# Patient Record
Sex: MALE | Race: WHITE
[De-identification: names, ages, dates, MRNs, and addresses within clinical notes are randomized per-mention and may not be internally consistent; named-entity substitution may affect disease eponyms.]

---

## 2021-04-19 ENCOUNTER — HOSPITAL ENCOUNTER (EMERGENCY)
Dept: HOSPITAL 75 - ER | Age: 41
Discharge: HOME | End: 2021-04-19
Payer: SELF-PAY

## 2021-04-19 VITALS — DIASTOLIC BLOOD PRESSURE: 78 MMHG | SYSTOLIC BLOOD PRESSURE: 149 MMHG

## 2021-04-19 VITALS — BODY MASS INDEX: 25.14 KG/M2 | HEIGHT: 68.9 IN | WEIGHT: 169.76 LBS

## 2021-04-19 DIAGNOSIS — K62.5: Primary | ICD-10-CM

## 2021-04-19 DIAGNOSIS — Z23: ICD-10-CM

## 2021-04-19 DIAGNOSIS — Z88.0: ICD-10-CM

## 2021-04-19 DIAGNOSIS — F15.90: ICD-10-CM

## 2021-04-19 DIAGNOSIS — F19.90: ICD-10-CM

## 2021-04-19 DIAGNOSIS — K62.89: ICD-10-CM

## 2021-04-19 DIAGNOSIS — F17.210: ICD-10-CM

## 2021-04-19 LAB
ALBUMIN SERPL-MCNC: 4.1 GM/DL (ref 3.2–4.5)
ALP SERPL-CCNC: 53 U/L (ref 40–136)
ALT SERPL-CCNC: 30 U/L (ref 0–55)
APTT BLD: 28 SEC (ref 24–35)
APTT PPP: YELLOW S
BACTERIA #/AREA URNS HPF: (no result) /HPF
BARBITURATES UR QL: NEGATIVE
BASOPHILS # BLD AUTO: 0.1 10^3/UL (ref 0–0.1)
BASOPHILS NFR BLD AUTO: 1 % (ref 0–10)
BENZODIAZ UR QL SCN: NEGATIVE
BILIRUB SERPL-MCNC: 0.5 MG/DL (ref 0.1–1)
BILIRUB UR QL STRIP: NEGATIVE
BUN/CREAT SERPL: 17
CALCIUM SERPL-MCNC: 8.5 MG/DL (ref 8.5–10.1)
CHLORIDE SERPL-SCNC: 107 MMOL/L (ref 98–107)
CO2 SERPL-SCNC: 23 MMOL/L (ref 21–32)
COCAINE UR QL: NEGATIVE
CREAT SERPL-MCNC: 1.06 MG/DL (ref 0.6–1.3)
EOSINOPHIL # BLD AUTO: 0.3 10^3/UL (ref 0–0.3)
EOSINOPHIL NFR BLD AUTO: 3 % (ref 0–10)
FIBRINOGEN PPP-MCNC: CLEAR MG/DL
GFR SERPLBLD BASED ON 1.73 SQ M-ARVRAT: > 60 ML/MIN
GLUCOSE SERPL-MCNC: 90 MG/DL (ref 70–105)
GLUCOSE UR STRIP-MCNC: NEGATIVE MG/DL
HCT VFR BLD CALC: 40 % (ref 40–54)
HGB BLD-MCNC: 13.7 G/DL (ref 13.3–17.7)
INR PPP: 1 (ref 0.8–1.4)
KETONES UR QL STRIP: NEGATIVE
LEUKOCYTE ESTERASE UR QL STRIP: NEGATIVE
LYMPHOCYTES # BLD AUTO: 1.9 10^3/UL (ref 1–4)
LYMPHOCYTES NFR BLD AUTO: 20 % (ref 12–44)
MANUAL DIFFERENTIAL PERFORMED BLD QL: NO
MCH RBC QN AUTO: 31 PG (ref 25–34)
MCHC RBC AUTO-ENTMCNC: 35 G/DL (ref 32–36)
MCV RBC AUTO: 89 FL (ref 80–99)
METHADONE UR QL SCN: NEGATIVE
METHAMPHETAMINE SCREEN URINE S: POSITIVE
MONOCYTES # BLD AUTO: 0.8 10^3/UL (ref 0–1)
MONOCYTES NFR BLD AUTO: 9 % (ref 0–12)
NEUTROPHILS # BLD AUTO: 6.7 10^3/UL (ref 1.8–7.8)
NEUTROPHILS NFR BLD AUTO: 68 % (ref 42–75)
NITRITE UR QL STRIP: NEGATIVE
OPIATES UR QL SCN: NEGATIVE
OXYCODONE UR QL: NEGATIVE
PH UR STRIP: 6 [PH] (ref 5–9)
PLATELET # BLD: 318 10^3/UL (ref 130–400)
PMV BLD AUTO: 9.4 FL (ref 9–12.2)
POTASSIUM SERPL-SCNC: 3.8 MMOL/L (ref 3.6–5)
PROPOXYPH UR QL: NEGATIVE
PROT SERPL-MCNC: 6.7 GM/DL (ref 6.4–8.2)
PROT UR QL STRIP: NEGATIVE
PROTHROMBIN TIME: 13.3 SEC (ref 12.2–14.7)
RBC #/AREA URNS HPF: (no result) /HPF
SODIUM SERPL-SCNC: 141 MMOL/L (ref 135–145)
SP GR UR STRIP: 1.02 (ref 1.02–1.02)
SQUAMOUS #/AREA URNS HPF: (no result) /HPF
TRICYCLICS UR QL SCN: NEGATIVE
WBC # BLD AUTO: 9.8 10^3/UL (ref 4.3–11)
WBC #/AREA URNS HPF: (no result) /HPF

## 2021-04-19 PROCEDURE — 81000 URINALYSIS NONAUTO W/SCOPE: CPT

## 2021-04-19 PROCEDURE — 99284 EMERGENCY DEPT VISIT MOD MDM: CPT

## 2021-04-19 PROCEDURE — 36415 COLL VENOUS BLD VENIPUNCTURE: CPT

## 2021-04-19 PROCEDURE — 74022 RADEX COMPL AQT ABD SERIES: CPT

## 2021-04-19 PROCEDURE — 85610 PROTHROMBIN TIME: CPT

## 2021-04-19 PROCEDURE — 90715 TDAP VACCINE 7 YRS/> IM: CPT

## 2021-04-19 PROCEDURE — 85025 COMPLETE CBC W/AUTO DIFF WBC: CPT

## 2021-04-19 PROCEDURE — 85730 THROMBOPLASTIN TIME PARTIAL: CPT

## 2021-04-19 PROCEDURE — 80306 DRUG TEST PRSMV INSTRMNT: CPT

## 2021-04-19 PROCEDURE — 82274 ASSAY TEST FOR BLOOD FECAL: CPT

## 2021-04-19 PROCEDURE — 80320 DRUG SCREEN QUANTALCOHOLS: CPT

## 2021-04-19 PROCEDURE — 74177 CT ABD & PELVIS W/CONTRAST: CPT

## 2021-04-19 PROCEDURE — 80053 COMPREHEN METABOLIC PANEL: CPT

## 2021-04-19 NOTE — DIAGNOSTIC IMAGING REPORT
EXAMINATION: CT Abdomen and Pelvis with intravenous contrast.



TECHNIQUE: Multiple contiguous axial images were obtained through

the abdomen and pelvis after the uneventful administration of

intravenous contrast. All CT scans use one or more of the

following dose optimizing techniques: automated exposure control,

MA and/or KvP adjustment based on a patient size and exam type,

or iterative reconstruction. 



HISTORY: Rectal bleeding.



COMPARISON: None available.



FINDINGS:



The heart is unremarkable. The included lung bases are clear.  



The liver, spleen, pancreas, adrenal glands, and kidneys have a

normal appearance. Cholelithiasis is present without CT evidence

of acute cholecystitis. There is no pathologically enlarged

mesenteric or retroperitoneal adenopathy. 



The bowel loops are nondilated. Mild bowel wall thickening is

seen involving the rectosigmoid colon. Normal appendix is seen in

the right lower quadrant. There is no free fluid or free air. 



No acute osseous abnormalities.



Ureters and bladder are grossly normal. There is no free air,

loculated collection, or adenopathy in the pelvis. 



IMPRESSION:

1. Bowel wall thickening involving the rectosigmoid colon, likely

representing proctitis. No evidence of retained foreign body.

2. Cholelithiasis without CT evidence of acute cholecystitis.



Dictated by: 



  Dictated on workstation # [id number]

## 2021-04-19 NOTE — ED GI
General


Chief Complaint:  Rect Problems


Stated Complaint:  ANAL BLEEDING


Nursing Triage Note:  


TO ED VIA POV AND AMBULATORY TO ROOM 5 WITH C/O "MAJOR BLEEDING" LAST NIGHT FOR 


APPROX 4H AFTER GLASS APPARATUS BROKE IN RECTUM DURING INTERCOURSE.


Sepsis Screen:  No Definite Risk


Source of Information:  Patient





History of Present Illness


Date Seen by Provider:  Apr 19, 2021


Time Seen by Provider:  19:48


Initial Comments


PT ARRIVES VIA POV FROM HOME


STATES YESTERDAY MORNING AROUND 10 AM, HE AND HIS GIRLFRIEND WERE HAVING 

INTERCOURSE, AND PUT A GLASS JAR IN HIS RECTUM AND IT BROKE


STATES HE HAD "A TREMENDOUS AMOUNT OF BLEEDING" FOR 4 HOURS AFTER.


STATES HE BELIEVES HE GOT ALL THE GLASS OUT


HAS NOT HAD ANY BLEEDING SINCE YESTERDAY


HAD A NORMAL BM THIS MORNING--NO PAIN AND NO BLEEDING WITH BM


NO ABDOMINAL PAIN 


NO NAUSEA/VOMITING


NO FEVER


NO URINARY SYMPTOMS


C/O PAIN TO BILATERAL LOWER BACK





HAS BEEN ABLE TO EAT AND DRINK NORMALLY ALL DAY





STATES HE HAS PUT OBJECTS IN HIS RECTUM BEFORE AND NOT HAD ANY PROBLEMS. 





LAST TETANUS VACCINATION IS UNKNOWN





PCP:NONE





Allergies and Home Medications


Allergies


Coded Allergies:  


     Penicillins (Verified  Allergy, Unknown, 4/19/21)





Patient Home Medication List


Home Medication List Reviewed:  Yes





Review of Systems


Review of Systems


Constitutional:  no symptoms reported


Respiratory:  No Symptoms Reported


Gastrointestinal:  See HPI


Genitourinary:  No Symptoms Reported


Musculoskeletal:  no symptoms reported


Skin:  no symptoms reported


Psychiatric/Neurological:  No Symptoms Reported


Endocrine:  No Symptoms Reported


Hematologic/Lymphatic:  No Symptoms Reported





Past Medical-Social-Family Hx


Past Med/Social Hx:  Reviewed and Corrections made


Patient Social History


Alcohol Use:  Occasionally Uses (HX OF HEAVY USE)


Drug of Choice:  THC, METH


Smoking Status:  Current Everyday Smoker (1 PPD)


Type Used:  Cigarettes


Recent Infectious Disease Expo:  No





Immunizations Up To Date


Tetanus Booster (TDap):  Unknown


PED Vaccines UTD:  Yes





Past Medical History


Surgeries:  Yes (KINDEY STONE REMOVAL/BASKET REMOVAL)


Renal


Respiratory:  No


Cardiac:  No


Neurological:  No


Genitourinary:  Yes


Kidney Stones


Gastrointestinal:  No


Musculoskeletal:  No


Endocrine:  No


HEENT:  No


Cancer:  No


Psychosocial:  No


Integumentary:  No


Blood Disorders:  No





Physical Exam


Vital Signs





Vital Signs - First Documented








 4/19/21 4/19/21





 19:39 21:21


 


Temp 36.4 


 


Pulse 102 


 


Resp 18 


 


B/P (MAP) 173/94 (120) 


 


Pulse Ox  98


 


O2 Delivery Room Air 





Capillary Refill : Less Than 3 Seconds


Height/Weight/BMI


Height: '"


Weight: lbs. oz. kg; 25.00 BMI


Method:


General Appearance:  WD/WN, no apparent distress, other (REEKS OF CIGARETTES, 

MALODOROUS, UNKEMPT; SPEECH RAPID, ERRATIC, ANXIOUS)


Respiratory:  normal breath sounds, no respiratory distress, no accessory muscle

use


Cardiovascular:  regular rate, rhythm, no murmur


Gastrointestinal:  non tender, soft


Rectal:  normal exam, normal rectal tone, heme negative stool, heme positive 

stool; No hemorrhoids, No mass, No tenderness; other (NO FOREIGN BODY NOTED)


Extremities:  normal inspection


Back:  no CVA tenderness, other (MILD BILATERAL LOWER BACK TENDERNESS)


Neurologic/Psychiatric:  CNs II-XII nml as tested, no motor/sensory deficits, 

alert, normal mood/affect (ANIXOUS; SPEECH RAPID AND ERRATIC), oriented x 3


Skin:  normal color, warm/dry, tattoos/piercings (TATTOOS)





Progress/Results/Core Measures


Results/Orders


Lab Results





Laboratory Tests








Test


 4/19/21


19:50 4/19/21


21:10 Range/Units


 


 


White Blood Count


 9.8 


 


 4.3-11.0


10^3/uL


 


Red Blood Count


 4.47 


 


 4.30-5.52


10^6/uL


 


Hemoglobin 13.7   13.3-17.7  g/dL


 


Hematocrit 40   40-54  %


 


Mean Corpuscular Volume 89   80-99  fL


 


Mean Corpuscular Hemoglobin 31   25-34  pg


 


Mean Corpuscular Hemoglobin


Concent 35 


 


 32-36  g/dL





 


Red Cell Distribution Width 13.2   10.0-14.5  %


 


Platelet Count


 318 


 


 130-400


10^3/uL


 


Mean Platelet Volume 9.4   9.0-12.2  fL


 


Immature Granulocyte % (Auto) 0    %


 


Neutrophils (%) (Auto) 68   42-75  %


 


Lymphocytes (%) (Auto) 20   12-44  %


 


Monocytes (%) (Auto) 9   0-12  %


 


Eosinophils (%) (Auto) 3   0-10  %


 


Basophils (%) (Auto) 1   0-10  %


 


Neutrophils # (Auto)


 6.7 


 


 1.8-7.8


10^3/uL


 


Lymphocytes # (Auto)


 1.9 


 


 1.0-4.0


10^3/uL


 


Monocytes # (Auto)


 0.8 


 


 0.0-1.0


10^3/uL


 


Eosinophils # (Auto)


 0.3 


 


 0.0-0.3


10^3/uL


 


Basophils # (Auto)


 0.1 


 


 0.0-0.1


10^3/uL


 


Immature Granulocyte # (Auto)


 0.0 


 


 0.0-0.1


10^3/uL


 


Prothrombin Time 13.3   12.2-14.7  SEC


 


INR Comment 1.0   0.8-1.4  


 


Activated Partial


Thromboplast Time 28 


 


 24-35  SEC





 


Sodium Level 141   135-145  MMOL/L


 


Potassium Level 3.8   3.6-5.0  MMOL/L


 


Chloride Level 107     MMOL/L


 


Carbon Dioxide Level 23   21-32  MMOL/L


 


Anion Gap 11   5-14  MMOL/L


 


Blood Urea Nitrogen 18   7-18  MG/DL


 


Creatinine


 1.06 


 


 0.60-1.30


MG/DL


 


Estimat Glomerular Filtration


Rate > 60 


 


  





 


BUN/Creatinine Ratio 17    


 


Glucose Level 90     MG/DL


 


Calcium Level 8.5   8.5-10.1  MG/DL


 


Corrected Calcium 8.4 L  8.5-10.1  MG/DL


 


Total Bilirubin 0.5   0.1-1.0  MG/DL


 


Aspartate Amino Transf


(AST/SGOT) 40 H


 


 5-34  U/L





 


Alanine Aminotransferase


(ALT/SGPT) 30 


 


 0-55  U/L





 


Alkaline Phosphatase 53     U/L


 


Total Protein 6.7   6.4-8.2  GM/DL


 


Albumin 4.1   3.2-4.5  GM/DL


 


Serum Alcohol < 10   <10  MG/DL


 


Urine Color  YELLOW   


 


Urine Clarity  CLEAR   


 


Urine pH  6.0  5-9  


 


Urine Specific Gravity  1.025 H 1.016-1.022  


 


Urine Protein  NEGATIVE  NEGATIVE  


 


Urine Glucose (UA)  NEGATIVE  NEGATIVE  


 


Urine Ketones  NEGATIVE  NEGATIVE  


 


Urine Nitrite  NEGATIVE  NEGATIVE  


 


Urine Bilirubin  NEGATIVE  NEGATIVE  


 


Urine Urobilinogen  0.2  < = 1.0  MG/DL


 


Urine Leukocyte Esterase  NEGATIVE  NEGATIVE  


 


Urine RBC (Auto)  NEGATIVE  NEGATIVE  


 


Urine RBC  NONE   /HPF


 


Urine WBC  RARE   /HPF


 


Urine Squamous Epithelial


Cells 


 RARE 


  /HPF





 


Urine Crystals  NONE   /LPF


 


Urine Bacteria  TRACE   /HPF


 


Urine Casts  NONE   /LPF


 


Urine Mucus  NEGATIVE   /LPF


 


Urine Culture Indicated  NO   


 


Urine Opiates Screen  NEGATIVE  NEGATIVE  


 


Urine Oxycodone Screen  NEGATIVE  NEGATIVE  


 


Urine Methadone Screen  NEGATIVE  NEGATIVE  


 


Urine Propoxyphene Screen  NEGATIVE  NEGATIVE  


 


Urine Barbiturates Screen  NEGATIVE  NEGATIVE  


 


Ur Tricyclic Antidepressants


Screen 


 NEGATIVE 


 NEGATIVE  





 


Urine Phencyclidine Screen  NEGATIVE  NEGATIVE  


 


Urine Amphetamines Screen  POSITIVE H NEGATIVE  


 


Urine Methamphetamines Screen  POSITIVE H NEGATIVE  


 


Urine Benzodiazepines Screen  NEGATIVE  NEGATIVE  


 


Urine Cocaine Screen  NEGATIVE  NEGATIVE  


 


Urine Cannabinoids Screen  NEGATIVE  NEGATIVE  








My Orders





Orders - SULLY MARSHALL DO


Ed Iv/Invasive Line Start (4/19/21 19:59)


Ct Abdomen/Pelvis W (4/19/21 19:59)


Alcohol (4/19/21 19:59)


Cbc With Automated Diff (4/19/21 19:59)


Comprehensive Metabolic Panel (4/19/21 19:59)


Drug Screen Stat (Urine) (4/19/21 19:59)


Protime With Inr (4/19/21 19:59)


Partial Thromboplastin Time (4/19/21 19:59)


Ua Culture If Indicated (4/19/21 19:59)


Acute Abd Series (4/19/21 19:59)


Ed Iv/Invasive Line Start (4/19/21 19:59)


Ns Iv 1000 Ml (Sodium Chloride 0.9%) (4/19/21 20:00)


Dipht,Pertuss(Acell),Tet Adult (Boostrix (4/19/21 20:00)


Iohexol Injection (Omnipaque 350 Mg/Ml 1 (4/19/21 20:45)


Received Contrast (Hold Metformin- Contr (4/19/21 20:45)


Ns (Ivpb) (Sodium Chloride 0.9% Ivpb Bag (4/19/21 20:45)


Sodium Chloride Flush (Catheter Flush Sy (4/19/21 20:45)


Fecal Occult Bedside (4/19/21 21:11)





Medications Given in ED





Current Medications








 Medications  Dose


 Ordered  Sig/Halina


 Route  Start Time


 Stop Time Status Last Admin


Dose Admin


 


 Diphtheria/


 Tetanus/Acell


 Pertussis  0.5 ml  ONCE ONCE


 IM  4/19/21 20:00


 4/19/21 20:02 DC 4/19/21 20:55


0.5 ML








Vital Signs/I&O











 4/19/21 4/19/21





 19:39 21:21


 


Temp 36.4 36.4


 


Pulse 102 98


 


Resp 18 16


 


B/P (MAP) 173/94 (120) 149/78 (120)


 


Pulse Ox  98


 


O2 Delivery Room Air Room Air














 4/20/21





 00:00


 


Intake Total 1000 ml


 


Balance 1000 ml














Blood Pressure Mean:                    120











Progress


Progress Note :  


Progress Note


NO COMPLAINTS DURING ER STAY





Diagnostic Imaging





Comments


ABDOMINAL XRAYS--PER RADIOLOGIST REPORT AT 2055


IMPRESSION:


1. No acute pleuroparenchymal process.


2. No evidence of bowel obstruction or large collections of free


intraperitoneal air.








CT ABDOMEN/PELVIS--PER RADIOLOGIST REPORT AT 2102


FINDINGS:





The heart is unremarkable. The included lung bases are clear.  





The liver, spleen, pancreas, adrenal glands, and kidneys have a


normal appearance. Cholelithiasis is present without CT evidence


of acute cholecystitis. There is no pathologically enlarged


mesenteric or retroperitoneal adenopathy. 





The bowel loops are nondilated. Mild bowel wall thickening is


seen involving the rectosigmoid colon. Normal appendix is seen in


the right lower quadrant. There is no free fluid or free air. 





No acute osseous abnormalities.





Ureters and bladder are grossly normal. There is no free air,


loculated collection, or adenopathy in the pelvis. 





IMPRESSION:


1. Bowel wall thickening involving the rectosigmoid colon, likely


representing proctitis. No evidence of retained foreign body.


2. Cholelithiasis without CT evidence of acute cholecystitis.


   Reviewed:  Reviewed by Me





Departure


Communication (Admissions)


2103--SPOKE WITH DR. VÁSQUEZ, SURGEON ON CALL. WILL SEE PT IN OFFICE THIS WEEK 

FOR FOLLOW UP





Impression





   Primary Impression:  


   Rectal bleeding


   Additional Impressions:  


   HISTORY OF RECTAL FOREIGN BODY


   Proctitis


   Illicit drug use


   Methamphetamine use


Disposition:  01 HOME, SELF-CARE


Condition:  Stable





Departure-Patient Inst.


Referrals:  


NINOSKA VÁSQUEZ DO





NO,LOCAL PHYSICIAN (PCP)


Primary Care Physician


Patient Instructions:  Bloody Stools, Adult (DC), Proctitis, Rectal Foreign Body





Add. Discharge Instructions:  


LOTS OF CLEAR LIQUIDS





TYLENOL AND MOTRIN AS NEEDED FOR PAIN





DO NOT PUT ANYTHING IN YOUR RECTUM





FOLLOW UP WITH DR. VÁSQUEZ THIS WEEK FOR FURTHER CARE--CALL IN THE MORNING TO 

SCHEDULE APPOINTMENT





All discharge instructions reviewed with patient and/or family. Voiced 

understanding.











SULLY MARSHALL DO                 Apr 19, 2021 21:00

## 2021-04-19 NOTE — DIAGNOSTIC IMAGING REPORT
Patient History:  Abdominal pain. Anal bleeding.. 



Technique: 3 views of the chest and abdomen were obtained.



Comparison:  None.



FINDINGS:



The lung volumes are normal. No focal consolidation is seen. No

large pleural effusion or pneumothorax is seen. The

cardiomediastinal silhouette is normal in size and contour. No

acute osseous abnormality is seen.  



No evidence of bowel obstruction or large collections of free

intraperitoneal air. Small amount of stool and air is seen

throughout the colon. Calcifications are present in the abdomen

and pelvis which are favored to be within the colon. No acute

osseous abnormalities.



IMPRESSION:

1. No acute pleuroparenchymal process.

2. No evidence of bowel obstruction or large collections of free

intraperitoneal air.



Dictated by: 



  Dictated on workstation # [id number]

## 2022-03-27 ENCOUNTER — HOSPITAL ENCOUNTER (EMERGENCY)
Dept: HOSPITAL 75 - ER FS | Age: 42
Discharge: HOME | End: 2022-03-27
Payer: SELF-PAY

## 2022-03-27 VITALS — DIASTOLIC BLOOD PRESSURE: 84 MMHG | SYSTOLIC BLOOD PRESSURE: 142 MMHG

## 2022-03-27 DIAGNOSIS — F41.9: ICD-10-CM

## 2022-03-27 DIAGNOSIS — F15.10: ICD-10-CM

## 2022-03-27 DIAGNOSIS — K21.9: Primary | ICD-10-CM

## 2022-03-27 DIAGNOSIS — K80.20: ICD-10-CM

## 2022-03-27 DIAGNOSIS — K29.70: ICD-10-CM

## 2022-03-27 DIAGNOSIS — F17.210: ICD-10-CM

## 2022-03-27 LAB
ALBUMIN SERPL-MCNC: 4.6 GM/DL (ref 3.2–4.5)
ALP SERPL-CCNC: 73 U/L (ref 40–136)
ALT SERPL-CCNC: 54 U/L (ref 0–55)
APTT BLD: 27 SEC (ref 24–35)
APTT PPP: YELLOW S
BACTERIA #/AREA URNS HPF: NEGATIVE /HPF
BARBITURATES UR QL: NEGATIVE
BASOPHILS # BLD AUTO: 0.1 10^3/UL (ref 0–0.1)
BASOPHILS NFR BLD AUTO: 1 % (ref 0–10)
BENZODIAZ UR QL SCN: NEGATIVE
BILIRUB SERPL-MCNC: 0.7 MG/DL (ref 0.1–1)
BILIRUB UR QL STRIP: NEGATIVE
BUN/CREAT SERPL: 13
CALCIUM SERPL-MCNC: 9.9 MG/DL (ref 8.5–10.1)
CHLORIDE SERPL-SCNC: 102 MMOL/L (ref 98–107)
CO2 SERPL-SCNC: 27 MMOL/L (ref 21–32)
COCAINE UR QL: NEGATIVE
CREAT SERPL-MCNC: 1.13 MG/DL (ref 0.6–1.3)
EOSINOPHIL # BLD AUTO: 0.1 10^3/UL (ref 0–0.3)
EOSINOPHIL NFR BLD AUTO: 1 % (ref 0–10)
FIBRINOGEN PPP-MCNC: CLEAR MG/DL
GFR SERPLBLD BASED ON 1.73 SQ M-ARVRAT: 85 ML/MIN
GLUCOSE SERPL-MCNC: 104 MG/DL (ref 70–105)
GLUCOSE UR STRIP-MCNC: NEGATIVE MG/DL
HCT VFR BLD CALC: 47 % (ref 40–54)
HGB BLD-MCNC: 16.7 G/DL (ref 13.3–17.7)
INR PPP: 1 (ref 0.8–1.4)
KETONES UR QL STRIP: NEGATIVE
LEUKOCYTE ESTERASE UR QL STRIP: NEGATIVE
LIPASE SERPL-CCNC: 23 U/L (ref 8–78)
LYMPHOCYTES # BLD AUTO: 1.7 10^3/UL (ref 1–4)
LYMPHOCYTES NFR BLD AUTO: 19 % (ref 12–44)
MAGNESIUM SERPL-MCNC: 1.9 MG/DL (ref 1.6–2.4)
MANUAL DIFFERENTIAL PERFORMED BLD QL: NO
MCH RBC QN AUTO: 30 PG (ref 25–34)
MCHC RBC AUTO-ENTMCNC: 35 G/DL (ref 32–36)
MCV RBC AUTO: 85 FL (ref 80–99)
METHADONE UR QL SCN: NEGATIVE
METHAMPHETAMINE SCREEN URINE S: POSITIVE
MONOCYTES # BLD AUTO: 0.8 10^3/UL (ref 0–1)
MONOCYTES NFR BLD AUTO: 9 % (ref 0–12)
NEUTROPHILS # BLD AUTO: 6.2 10^3/UL (ref 1.8–7.8)
NEUTROPHILS NFR BLD AUTO: 70 % (ref 42–75)
NITRITE UR QL STRIP: NEGATIVE
OPIATES UR QL SCN: NEGATIVE
OXYCODONE UR QL: NEGATIVE
PH UR STRIP: 6.5 [PH] (ref 5–9)
PLATELET # BLD: 291 10^3/UL (ref 130–400)
PMV BLD AUTO: 9.7 FL (ref 9–12.2)
POTASSIUM SERPL-SCNC: 4.8 MMOL/L (ref 3.6–5)
PROPOXYPH UR QL: NEGATIVE
PROT SERPL-MCNC: 7.3 GM/DL (ref 6.4–8.2)
PROT UR QL STRIP: NEGATIVE
PROTHROMBIN TIME: 13.1 SEC (ref 12.2–14.7)
RBC #/AREA URNS HPF: (no result) /HPF
SODIUM SERPL-SCNC: 140 MMOL/L (ref 135–145)
SP GR UR STRIP: 1.01 (ref 1.02–1.02)
TRICYCLICS UR QL SCN: NEGATIVE
WBC # BLD AUTO: 8.9 10^3/UL (ref 4.3–11)
WBC #/AREA URNS HPF: (no result) /HPF

## 2022-03-27 PROCEDURE — 83690 ASSAY OF LIPASE: CPT

## 2022-03-27 PROCEDURE — 85730 THROMBOPLASTIN TIME PARTIAL: CPT

## 2022-03-27 PROCEDURE — 71045 X-RAY EXAM CHEST 1 VIEW: CPT

## 2022-03-27 PROCEDURE — 80320 DRUG SCREEN QUANTALCOHOLS: CPT

## 2022-03-27 PROCEDURE — 93005 ELECTROCARDIOGRAM TRACING: CPT

## 2022-03-27 PROCEDURE — 80306 DRUG TEST PRSMV INSTRMNT: CPT

## 2022-03-27 PROCEDURE — 83735 ASSAY OF MAGNESIUM: CPT

## 2022-03-27 PROCEDURE — 36415 COLL VENOUS BLD VENIPUNCTURE: CPT

## 2022-03-27 PROCEDURE — 82274 ASSAY TEST FOR BLOOD FECAL: CPT

## 2022-03-27 PROCEDURE — 84484 ASSAY OF TROPONIN QUANT: CPT

## 2022-03-27 PROCEDURE — 85610 PROTHROMBIN TIME: CPT

## 2022-03-27 PROCEDURE — 74177 CT ABD & PELVIS W/CONTRAST: CPT

## 2022-03-27 PROCEDURE — 81000 URINALYSIS NONAUTO W/SCOPE: CPT

## 2022-03-27 PROCEDURE — 99284 EMERGENCY DEPT VISIT MOD MDM: CPT

## 2022-03-27 PROCEDURE — 93041 RHYTHM ECG TRACING: CPT

## 2022-03-27 PROCEDURE — 80053 COMPREHEN METABOLIC PANEL: CPT

## 2022-03-27 PROCEDURE — 85379 FIBRIN DEGRADATION QUANT: CPT

## 2022-03-27 PROCEDURE — 85025 COMPLETE CBC W/AUTO DIFF WBC: CPT

## 2022-03-27 NOTE — DIAGNOSTIC IMAGING REPORT
INDICATION:  41-year-old male, chest pain.  



TECHNIQUE:  Single view chest 1:05 PM.



CORRELATION STUDY:  None



FINDINGS: 

The heart size, mediastinal configuration and pulmonary

vascularity are within normal limits.  

The lungs are clear with no consolidating infiltrate. There is no

significant effusion or pneumothorax. 



IMPRESSION: 

1. Negative for acute abnormality of the chest.



Dictated by: 



  Dictated on workstation # [id number]

## 2022-03-27 NOTE — ED CARDIAC GENERAL
History of Present Illness


General


Chief Complaint:  Chest Pain


Stated Complaint:  CHEST PAIN, VOMITTING





History of Present Illness


Date Seen by Provider:  Mar 27, 2022


Time Seen by Provider:  12:25


Initial Comments


41-year-old male with PMH of gallstones, recent release from an 8-month stint in

[facility], is here with complaints of lower sternal chest pain and epigastric pain, 

and right upper quadrant pain in the abdomen which began around 3 PM yesterday 

with associated acid reflux.  Patient states that the pain is coming and going 

and at its worst intensity it is a 7 out of 10.  Patient had one episode of 

vomiting earlier today morning.  Denies fever, URI symptoms, SOB, diarrhea, 

dysuria, flank pain, nausea.  Patient's last meal was last night around 8:30 PM,

when he had a hamburger.  Patient has not eaten anything today morning.  Patient

is unsure about gastric reflux.  Patient was a past meth user prior to being 

incarcerated.  He is anxious and concerned that he will start using meth again, 

and he really does not want to start using it again.  Denies drugs or alcohol in

the past couple of days.


ASA po PTA:  No





Allergies and Home Medications


Allergies


Coded Allergies:  


     Penicillins (Verified  Allergy, Unknown, 21)





Patient Home Medication List


Home Medication List Reviewed:  Yes





Review of Systems


Review of Systems


Constitutional:  no symptoms reported


EENTM:  No Symptoms Reported


Respiratory:  No Symptoms Reported


Cardiovascular:  Chest Pain


Gastrointestinal:  Abdominal Pain


Genitourinary:  No Symptoms Reported


Musculoskeletal:  no symptoms reported


Psychiatric/Neurological:  No Symptoms Reported


Endocrine:  No Symptoms Reported


Hematologic/Lymphatic:  No Symptoms Reported





Past Medical-Social-Family Hx


Patient Social History


Tobacco Use?:  Yes


Tobacco type used:  Cigarettes


Smoking Status:  Current Everyday Smoker


Use of E-Cig and/or Vaping dev:  No


Substance use?:  Yes


Substance type:  Amphetamines


Additional substance use comme:  PAST METH USE X 3 YEARS


Alcohol Use?:  No


Pt feels they are or have been:  No





Immunizations Up To Date


Tetanus Booster (TDap):  Unknown


PED Vaccines UTD:  Yes





Past Medical History


Surgeries:  Yes (KINDEY STONE REMOVAL/BASKET REMOVAL)


Renal


Respiratory:  No


Cardiac:  No


Neurological:  No


Genitourinary:  Yes


Kidney Stones


Gastrointestinal:  No


Musculoskeletal:  No


Endocrine:  No


HEENT:  No


Cancer:  No


Psychosocial:  No


Integumentary:  No


Blood Disorders:  No





Physical Exam


Vital Signs





Vital Signs - First Documented








 3/27/22





 12:25


 


Temp 36.1


 


Pulse 98


 


Resp 16


 


B/P (MAP) 165/106 (125)


 


Pulse Ox 100


 


O2 Delivery Room Air





Capillary Refill : Less Than 3 Seconds


Height, Weight, BMI


Height: '"


Weight: lbs. oz. kg; 25.00 BMI


Method:


General Appearance:  Anxious, Thin


HEENT:  PERRL/EOMI


Neck:  Full Range of Motion


Respiratory:  Chest Non Tender, Lungs Clear, Normal Breath Sounds


Cardiovascular:  Regular Rate, Rhythm, No Edema, No Gallop, No JVD, No Murmur


Gastrointestinal:  Normal Bowel Sounds, No Organomegaly, No Pulsatile Mass, 

Soft, Tenderness (over devika epigastric area and RUQ)


Extremity:  Normal Range of Motion


Neurologic/Psychiatric:  Alert, Oriented x3, No Motor/Sensory Deficits, Normal 

Mood/Affect, CNs II-XII Norm as Tested


Skin:  Normal Color


Lymphatic:  No Adenopathy





Progress/Results/Core Measures


Results/Orders


Lab Results





Laboratory Tests








Test


 3/27/22


12:27 3/27/22


14:33 Range/Units


 


 


White Blood Count


 8.9 


 


 4.3-11.0


10^3/uL


 


Red Blood Count


 5.53 H


 


 4.30-5.52


10^6/uL


 


Hemoglobin 16.7   13.3-17.7  g/dL


 


Hematocrit 47   40-54  %


 


Mean Corpuscular Volume 85   80-99  fL


 


Mean Corpuscular Hemoglobin 30   25-34  pg


 


Mean Corpuscular Hemoglobin


Concent 35 


 


 32-36  g/dL





 


Red Cell Distribution Width 11.8   10.0-14.5  %


 


Platelet Count


 291 


 


 130-400


10^3/uL


 


Mean Platelet Volume 9.7   9.0-12.2  fL


 


Immature Granulocyte % (Auto) 0    %


 


Neutrophils (%) (Auto) 70   42-75  %


 


Lymphocytes (%) (Auto) 19   12-44  %


 


Monocytes (%) (Auto) 9   0-12  %


 


Eosinophils (%) (Auto) 1   0-10  %


 


Basophils (%) (Auto) 1   0-10  %


 


Neutrophils # (Auto)


 6.2 


 


 1.8-7.8


10^3/uL


 


Lymphocytes # (Auto)


 1.7 


 


 1.0-4.0


10^3/uL


 


Monocytes # (Auto)


 0.8 


 


 0.0-1.0


10^3/uL


 


Eosinophils # (Auto)


 0.1 


 


 0.0-0.3


10^3/uL


 


Basophils # (Auto)


 0.1 


 


 0.0-0.1


10^3/uL


 


Immature Granulocyte # (Auto)


 0.0 


 


 0.0-0.1


10^3/uL


 


Prothrombin Time 13.1   12.2-14.7  SEC


 


INR Comment 1.0   0.8-1.4  


 


Activated Partial


Thromboplast Time 27 


 


 24-35  SEC





 


D-Dimer


 0.23 


 


 0.00-0.49


UG/ML


 


Sodium Level 140   135-145  MMOL/L


 


Potassium Level 4.8   3.6-5.0  MMOL/L


 


Chloride Level 102     MMOL/L


 


Carbon Dioxide Level 27   21-32  MMOL/L


 


Anion Gap 11   5-14  MMOL/L


 


Blood Urea Nitrogen 15   7-18  MG/DL


 


Creatinine


 1.13 


 


 0.60-1.30


MG/DL


 


Estimat Glomerular Filtration


Rate 85 


 


  





 


BUN/Creatinine Ratio 13    


 


Glucose Level 104     MG/DL


 


Calcium Level 9.9   8.5-10.1  MG/DL


 


Corrected Calcium    8.5-10.1  MG/DL


 


Magnesium Level 1.9   1.6-2.4  MG/DL


 


Total Bilirubin 0.7   0.1-1.0  MG/DL


 


Aspartate Amino Transf


(AST/SGOT) 64 H


 


 5-34  U/L





 


Alanine Aminotransferase


(ALT/SGPT) 54 


 


 0-55  U/L





 


Alkaline Phosphatase 73     U/L


 


Troponin I < 0.30   <0.30  NG/ML


 


Total Protein 7.3   6.4-8.2  GM/DL


 


Albumin 4.6 H  3.2-4.5  GM/DL


 


Lipase 23   8-78  U/L


 


Serum Alcohol < 10   <10  MG/DL


 


Urine Color  YELLOW   


 


Urine Clarity  CLEAR   


 


Urine pH  6.5  5-9  


 


Urine Specific Gravity  1.010 L 1.016-1.022  


 


Urine Protein  NEGATIVE  NEGATIVE  


 


Urine Glucose (UA)  NEGATIVE  NEGATIVE  


 


Urine Ketones  NEGATIVE  NEGATIVE  


 


Urine Nitrite  NEGATIVE  NEGATIVE  


 


Urine Bilirubin  NEGATIVE  NEGATIVE  


 


Urine Urobilinogen  0.2  < = 1.0  MG/DL


 


Urine Leukocyte Esterase  NEGATIVE  NEGATIVE  


 


Urine RBC (Auto)  NEGATIVE  NEGATIVE  


 


Urine RBC  0-2   /HPF


 


Urine WBC  NONE   /HPF


 


Urine Crystals  NONE   /LPF


 


Urine Bacteria  NEGATIVE   /HPF


 


Urine Casts  NONE   /LPF


 


Urine Mucus  MODERATE H  /LPF


 


Urine Culture Indicated  NO   


 


Urine Opiates Screen  NEGATIVE  NEGATIVE  


 


Urine Oxycodone Screen  NEGATIVE  NEGATIVE  


 


Urine Methadone Screen  NEGATIVE  NEGATIVE  


 


Urine Propoxyphene Screen  NEGATIVE  NEGATIVE  


 


Urine Barbiturates Screen  NEGATIVE  NEGATIVE  


 


Ur Tricyclic Antidepressants


Screen 


 NEGATIVE 


 NEGATIVE  





 


Urine Phencyclidine Screen  NEGATIVE  NEGATIVE  


 


Urine Amphetamines Screen  POSITIVE H NEGATIVE  


 


Urine Methamphetamines Screen  POSITIVE H NEGATIVE  


 


Urine Benzodiazepines Screen  NEGATIVE  NEGATIVE  


 


Urine Cocaine Screen  NEGATIVE  NEGATIVE  


 


Urine Cannabinoids Screen  NEGATIVE  NEGATIVE  








My Orders





Orders - BRIAN ROCK MD


Cbc With Automated Diff (3/27/22 12:35)


Magnesium (3/27/22 12:35)


Chest 1 View Ap/Pa Only (3/27/22 12:35)


Ekg Tracing (3/27/22 12:35)


Comprehensive Metabolic Panel (3/27/22 12:35)


Protime With Inr (3/27/22 12:35)


Partial Thromboplastin Time (3/27/22 12:35)


O2 (3/27/22 12:35)


Monitor-Rhythm Ecg Trace Only (3/27/22 12:35)


Ed Iv/Invasive Line Start (3/27/22 12:35)


Lipase (3/27/22 12:35)


Fibrin Degradation Products (3/27/22 12:35)


Troponin I Fs (3/27/22 12:35)


Ua Culture If Indicated (3/27/22 12:35)


Ct Abdomen/Pelvis W (3/27/22 12:35)


Drug Screen Stat (Urine) (3/27/22 12:36)


Alcohol (3/27/22 12:36)


Fecal Occult Bedside (3/27/22 12:46)


Iohexol Injection (Omnipaque 350 Mg/Ml 1 (3/27/22 14:00)


Received Contrast (Hold Metformin- Contr (3/27/22 14:00)


Sodium Chloride Flush (Catheter Flush Sy (3/27/22 14:00)


Ns (Ivpb) (Sodium Chloride 0.9% Ivpb Bag (3/27/22 14:00)


Ed Iv/Invasive Line Start (3/27/22 14:52)


Ns Iv 1000 Ml (Sodium Chloride 0.9%) (3/27/22 15:00)


Antacid  Suspension (Mylanta  Suspension (3/27/22 15:00)


Famotidine Injection (Pepcid Injection) (3/27/22 15:19)





Medications Given in ED





Current Medications








 Medications  Dose


 Ordered  Sig/Halina


 Route  Start Time


 Stop Time Status Last Admin


Dose Admin


 


 Al Hydrox/Mg


 Hydrox/Simethicone  30 ml  ONCE  ONCE


 PO  3/27/22 15:00


 3/27/22 15:01 DC 3/27/22 15:00


30 ML


 


 Iohexol  100 ml  ONCE  ONCE


 IV  3/27/22 14:00


 3/27/22 14:03 DC 3/27/22 14:03


100 ML


 


 Sodium Chloride  10 ml  AS NEEDED  PRN


 IV  3/27/22 14:00


    3/27/22 14:03


10 ML


 


 Sodium Chloride  100 ml  ONCE  ONCE


 IV  3/27/22 14:00


 3/27/22 14:03 DC 3/27/22 14:03


40 ML








Vital Signs/I&O











 3/27/22





 12:25


 


Temp 36.1


 


Pulse 98


 


Resp 16


 


B/P (MAP) 165/106 (125)


 


Pulse Ox 100


 


O2 Delivery Room Air











Progress


Progress Note :  


Progress Note


1. CHEST PAIN: GERD/ METHAMPHETAMINE ABUSE


- EKG:NSR


- CXR: normal


- Troponin: normal x1


- Labs: unremarkable except for elevated AST


- UDS: methamphetamine positive


- Pt still denies taking meth and is worried because he just got out of [facility] and

needs to do an UDS tomorrow. 


- UA: normal


- s. ETOH: normal


- NS IVF bolus STAT


2. ABDOMINAL PAIN: GERD/ GASTRITIS/ CHRONIC CHOLELITHIASIS


- CT ABD & PELVIS:


- Lipase normal


- CBC/ CMP: unremarkable


- Mylanta and Pepcid 20mg iv  STAT


- F/u with general surgery clinic, info in [place] papers


- Pepcid OTC daily


Initial ECG Impression Date:  Mar 27, 2022


Initial ECG Impression Time:  12:27


Initial ECG Rhythm:  Normal Sinus


Initial ECG Intervals:  Normal


Initial ECG Impression:  Normal


Initial ECG Comparisson:  No Previous ECG Available





Diagnostic Imaging





   Diagonstic Imaging:  Xray


   Plain Films/CT/US/NM/MRI:  chest


Comments


                 [facility] [place], [place]





NAME:   CRYSTAL BOWDEN BALDO


MED REC#:   [id number]


ACCOUNT#:   [id number]


PT STATUS:   REG ER


:   1980


PHYSICIAN:   BRIAN ROCK MD


ADMIT DATE:   22/ER FS


                                   ***Draft***


Date of Exam:22





CHEST 1 VIEW AP/PA ONLY








INDICATION:  41-year-old male, chest pain.  





TECHNIQUE:  Single view chest 1:05 PM.





CORRELATION STUDY:  None





FINDINGS: 


The heart size, mediastinal configuration and pulmonary


vascularity are within normal limits.  


The lungs are clear with no consolidating infiltrate. There is no


significant effusion or pneumothorax. 





IMPRESSION: 


1. Negative for acute abnormality of the chest.





  Dictated on workstation # [id number]








Dict:   [id number] [id number]


Trans:   22 1311


DO [id number]





Interpreted by:     JANIE WATTERS DO


Electronically signed by:  











                 [facility] [place], [place]





NAME:   CRYSTAL BOWDEN


MED REC#:   [id number]


ACCOUNT#:   [id number]


PT STATUS:   REG ER


:   1980


PHYSICIAN:   BRIAN ROCK MD


ADMIT DATE:   22/ER FS


                                  ***Signed***


Date of Exam:22





CT ABDOMEN/PELVIS W








PROCEDURE: CT abdomen and pelvis with contrast.





TECHNIQUE: Multiple contiguous axial images were obtained through


the abdomen and pelvis after administration of intravenous


contrast. Auto Exposure Controls were utilized during the CT exam


to meet ALARA standards for radiation dose reduction. All CT


scans use one or more of the following dose optimizing


techniques: automated exposure control, MA and/or KvP adjustment


based on patient size and exam type or iterative reconstruction.





INDICATION:  Epigastric and right upper quadrant abdominal pain





COMPARISON: 2021





FINDINGS:





Lung bases are clear. The heart is normal in size.





The liver demonstrates a subcentimeter hypodensity in the


inferior segment 4 which likely represents a small cyst. There is


fatty infiltration along the falciform ligament. The spleen


appears normal. The pancreas is normal. The adrenal glands appear


normal. The gallbladder contains multiple calcified stones. There


is no pericholecystic edema. The kidneys demonstrate no


hydronephrosis. No enhancing lesions are identified.





The appendix is normal. The bowel loops are nondistended without


obstruction. No free fluid or free air is seen. The aorta is


normal in caliber. There is mild atherosclerosis, somewhat


greater than expected for age. There are a few mildly prominent


lymph nodes in the right lower quadrant, which appears stable


since 2021. No acute osseous abnormality is seen.





IMPRESSION:


1. Cholelithiasis. Atherosclerosis, greater than expected for


age.





Dictated by: 





  Dictated on workstation # [id number]





Departure


Impression





   Primary Impression:  


   GERD (gastroesophageal reflux disease)


   Qualified Codes:  K21.9 - Gastro-esophageal reflux disease without 

   esophagitis


   Additional Impressions:  


   Gastritis


   Qualified Codes:  K29.00 - Acute gastritis without bleeding


   Anxiety


   Methamphetamine abuse


   Cholelithiasis


   Qualified Codes:  K80.80 - Other cholelithiasis without obstruction


Disposition:   HOME, SELF-CARE


Condition:  Improved





Departure-Patient Inst.


Referrals:  


BEAU SAMPSON DO





Surgery clinic





NO,LOCAL PHYSICIAN (PCP)


Primary Care Physician


Patient Instructions:  Acid Reflux and Gastroesophageal Reflux Disease in 

Adults, Anxiety, Adult (DC), Drug Abuse Treatment, Gastritis, Meth Mouth, 

Gallbladder Diet, Gallstones





Add. Discharge Instructions:  


Advised not to do meth and not to hang around people who do


F/u with PCP and general surgery clinic


-The patient was seen in the ED, and treated appropriately to presentation at a 

specific point in time. Patient is informed that there is a possibility that 

disease and illness can evolve and change in acuity rapidly or slowly after 

patient is discharged from the ER. Precautionary advice given to the patient for

immediate return to ER if symptoms worsen or do not resolve, and to seek 

emergency care sooner rather than later. Pt also advised on the importance of 

PCP follow up and compliance with management and follow up plan. Pt verbally 

expressed understanding.





All discharge instructions reviewed with patient and/or family. Voiced 

understanding.











BRIAN ROCK MD              Mar 27, 2022 12:44

## 2022-03-27 NOTE — ED FALL/INJURY
General


Stated Complaint:  CHEST PAIN, VOMITTING





History of Present Illness


Date Seen by Provider:  Mar 27, 2022


Time Seen by Provider:  12:25





Allergies and Home Medications


Allergies


Coded Allergies:  


     Penicillins (Verified  Allergy, Unknown, 4/19/21)





Past Medical-Social-Family Hx


Immunizations Up To Date


Tetanus Booster (TDap):  Unknown


PED Vaccines UTD:  Yes





Past Medical History


Surgeries:  Yes (KINDEY STONE REMOVAL/BASKET REMOVAL)


Renal


Respiratory:  No


Cardiac:  No


Neurological:  No


Genitourinary:  Yes


Kidney Stones


Gastrointestinal:  No


Musculoskeletal:  No


Endocrine:  No


HEENT:  No


Cancer:  No


Psychosocial:  No


Integumentary:  No


Blood Disorders:  No





Physical Exam


Vital Signs


Capillary Refill :


Height, Weight, BMI


Height: '"


Weight: lbs. oz. kg; 25.00 BMI


Method:





Departure


Departure-Patient Inst.


Referrals:  


NO,LOCAL PHYSICIAN (PCP/Family)


Primary Care Physician











BRIAN ROCK MD              Mar 27, 2022 12:34

## 2022-03-27 NOTE — DIAGNOSTIC IMAGING REPORT
PROCEDURE: CT abdomen and pelvis with contrast.



TECHNIQUE: Multiple contiguous axial images were obtained through

the abdomen and pelvis after administration of intravenous

contrast. Auto Exposure Controls were utilized during the CT exam

to meet ALARA standards for radiation dose reduction. All CT

scans use one or more of the following dose optimizing

techniques: automated exposure control, MA and/or KvP adjustment

based on patient size and exam type or iterative reconstruction.



INDICATION:  Epigastric and right upper quadrant abdominal pain



COMPARISON: 04/19/2021



FINDINGS:



Lung bases are clear. The heart is normal in size.



The liver demonstrates a subcentimeter hypodensity in the

inferior segment 4 which likely represents a small cyst. There is

fatty infiltration along the falciform ligament. The spleen

appears normal. The pancreas is normal. The adrenal glands appear

normal. The gallbladder contains multiple calcified stones. There

is no pericholecystic edema. The kidneys demonstrate no

hydronephrosis. No enhancing lesions are identified.



The appendix is normal. The bowel loops are nondistended without

obstruction. No free fluid or free air is seen. The aorta is

normal in caliber. There is mild atherosclerosis, somewhat

greater than expected for age. There are a few mildly prominent

lymph nodes in the right lower quadrant, which appears stable

since April 2021. No acute osseous abnormality is seen.



IMPRESSION:

1. Cholelithiasis. Atherosclerosis, greater than expected for

age.



Dictated by: 



  Dictated on workstation # [id number]